# Patient Record
Sex: FEMALE | Employment: UNEMPLOYED | URBAN - METROPOLITAN AREA
[De-identification: names, ages, dates, MRNs, and addresses within clinical notes are randomized per-mention and may not be internally consistent; named-entity substitution may affect disease eponyms.]

---

## 2021-04-11 ENCOUNTER — HOSPITAL ENCOUNTER (EMERGENCY)
Facility: CLINIC | Age: 46
Discharge: HOME | End: 2021-04-12
Attending: EMERGENCY MEDICINE
Payer: COMMERCIAL

## 2021-04-11 ENCOUNTER — APPOINTMENT (OUTPATIENT)
Dept: CT IMAGING | Facility: CLINIC | Age: 46
End: 2021-04-11
Attending: EMERGENCY MEDICINE
Payer: COMMERCIAL

## 2021-04-11 ENCOUNTER — HOSPITAL ENCOUNTER (EMERGENCY)
Facility: CLINIC | Age: 46
Discharge: HOME | End: 2021-04-11
Attending: EMERGENCY MEDICINE | Admitting: EMERGENCY MEDICINE
Payer: COMMERCIAL

## 2021-04-11 VITALS
HEART RATE: 97 BPM | DIASTOLIC BLOOD PRESSURE: 86 MMHG | BODY MASS INDEX: 25.51 KG/M2 | WEIGHT: 149.4 LBS | OXYGEN SATURATION: 98 % | TEMPERATURE: 97.9 F | RESPIRATION RATE: 18 BRPM | SYSTOLIC BLOOD PRESSURE: 124 MMHG | HEIGHT: 64 IN

## 2021-04-11 DIAGNOSIS — U07.1 INFECTION DUE TO 2019 NOVEL CORONAVIRUS: ICD-10-CM

## 2021-04-11 DIAGNOSIS — R00.2 PALPITATIONS: ICD-10-CM

## 2021-04-11 DIAGNOSIS — F41.9 ANXIETY: ICD-10-CM

## 2021-04-11 DIAGNOSIS — E87.6 HYPOKALEMIA: ICD-10-CM

## 2021-04-11 DIAGNOSIS — U07.1 2019 NOVEL CORONAVIRUS DISEASE (COVID-19): ICD-10-CM

## 2021-04-11 LAB
ALBUMIN SERPL-MCNC: 3.8 G/DL (ref 3.4–5)
ALP SERPL-CCNC: 67 U/L (ref 40–150)
ALT SERPL W P-5'-P-CCNC: 31 U/L (ref 0–50)
ANION GAP SERPL CALCULATED.3IONS-SCNC: 12 MMOL/L (ref 3–14)
ANION GAP SERPL CALCULATED.3IONS-SCNC: 7 MMOL/L (ref 3–14)
AST SERPL W P-5'-P-CCNC: 16 U/L (ref 0–45)
BASOPHILS # BLD AUTO: 0 10E9/L (ref 0–0.2)
BASOPHILS # BLD AUTO: 0 10E9/L (ref 0–0.2)
BASOPHILS NFR BLD AUTO: 0.3 %
BASOPHILS NFR BLD AUTO: 0.4 %
BILIRUB SERPL-MCNC: 0.7 MG/DL (ref 0.2–1.3)
BUN SERPL-MCNC: 13 MG/DL (ref 7–30)
BUN SERPL-MCNC: 6 MG/DL (ref 7–30)
CALCIUM SERPL-MCNC: 9 MG/DL (ref 8.5–10.1)
CALCIUM SERPL-MCNC: 9.3 MG/DL (ref 8.5–10.1)
CHLORIDE SERPL-SCNC: 105 MMOL/L (ref 94–109)
CHLORIDE SERPL-SCNC: 106 MMOL/L (ref 94–109)
CO2 BLDCOV-SCNC: 17 MMOL/L (ref 21–28)
CO2 BLDCOV-SCNC: 22 MMOL/L (ref 21–28)
CO2 SERPL-SCNC: 20 MMOL/L (ref 20–32)
CO2 SERPL-SCNC: 24 MMOL/L (ref 20–32)
CREAT SERPL-MCNC: 0.67 MG/DL (ref 0.52–1.04)
CREAT SERPL-MCNC: 0.77 MG/DL (ref 0.52–1.04)
D DIMER PPP FEU-MCNC: 2.3 UG/ML FEU (ref 0–0.5)
DIFFERENTIAL METHOD BLD: NORMAL
DIFFERENTIAL METHOD BLD: NORMAL
EOSINOPHIL # BLD AUTO: 0 10E9/L (ref 0–0.7)
EOSINOPHIL # BLD AUTO: 0 10E9/L (ref 0–0.7)
EOSINOPHIL NFR BLD AUTO: 0.3 %
EOSINOPHIL NFR BLD AUTO: 0.5 %
ERYTHROCYTE [DISTWIDTH] IN BLOOD BY AUTOMATED COUNT: 11.9 % (ref 10–15)
ERYTHROCYTE [DISTWIDTH] IN BLOOD BY AUTOMATED COUNT: 11.9 % (ref 10–15)
GFR SERPL CREATININE-BSD FRML MDRD: >90 ML/MIN/{1.73_M2}
GFR SERPL CREATININE-BSD FRML MDRD: >90 ML/MIN/{1.73_M2}
GLUCOSE SERPL-MCNC: 117 MG/DL (ref 70–99)
GLUCOSE SERPL-MCNC: 144 MG/DL (ref 70–99)
HCG SERPL QL: NEGATIVE
HCT VFR BLD AUTO: 40.9 % (ref 35–47)
HCT VFR BLD AUTO: 41.7 % (ref 35–47)
HGB BLD-MCNC: 14.1 G/DL (ref 11.7–15.7)
HGB BLD-MCNC: 14.3 G/DL (ref 11.7–15.7)
IMM GRANULOCYTES # BLD: 0 10E9/L (ref 0–0.4)
IMM GRANULOCYTES # BLD: 0 10E9/L (ref 0–0.4)
IMM GRANULOCYTES NFR BLD: 0.1 %
IMM GRANULOCYTES NFR BLD: 0.3 %
LACTATE BLD-SCNC: 0.8 MMOL/L (ref 0.7–2)
LACTATE BLD-SCNC: 0.8 MMOL/L (ref 0.7–2.1)
LACTATE BLD-SCNC: 4.4 MMOL/L (ref 0.7–2.1)
LYMPHOCYTES # BLD AUTO: 2.3 10E9/L (ref 0.8–5.3)
LYMPHOCYTES # BLD AUTO: 2.7 10E9/L (ref 0.8–5.3)
LYMPHOCYTES NFR BLD AUTO: 26.4 %
LYMPHOCYTES NFR BLD AUTO: 36.8 %
MCH RBC QN AUTO: 29.7 PG (ref 26.5–33)
MCH RBC QN AUTO: 30.1 PG (ref 26.5–33)
MCHC RBC AUTO-ENTMCNC: 34.3 G/DL (ref 31.5–36.5)
MCHC RBC AUTO-ENTMCNC: 34.5 G/DL (ref 31.5–36.5)
MCV RBC AUTO: 87 FL (ref 78–100)
MCV RBC AUTO: 87 FL (ref 78–100)
MONOCYTES # BLD AUTO: 0.6 10E9/L (ref 0–1.3)
MONOCYTES # BLD AUTO: 0.7 10E9/L (ref 0–1.3)
MONOCYTES NFR BLD AUTO: 7.8 %
MONOCYTES NFR BLD AUTO: 7.8 %
NEUTROPHILS # BLD AUTO: 4 10E9/L (ref 1.6–8.3)
NEUTROPHILS # BLD AUTO: 5.8 10E9/L (ref 1.6–8.3)
NEUTROPHILS NFR BLD AUTO: 54.2 %
NEUTROPHILS NFR BLD AUTO: 65.1 %
NRBC # BLD AUTO: 0 10*3/UL
NRBC # BLD AUTO: 0 10*3/UL
NRBC BLD AUTO-RTO: 0 /100
NRBC BLD AUTO-RTO: 0 /100
PCO2 BLDV: 21 MM HG (ref 40–50)
PCO2 BLDV: 37 MM HG (ref 40–50)
PH BLDV: 7.38 PH (ref 7.32–7.43)
PH BLDV: 7.52 PH (ref 7.32–7.43)
PLATELET # BLD AUTO: 345 10E9/L (ref 150–450)
PLATELET # BLD AUTO: 362 10E9/L (ref 150–450)
PO2 BLDV: 40 MM HG (ref 25–47)
PO2 BLDV: 47 MM HG (ref 25–47)
POTASSIUM SERPL-SCNC: 2.8 MMOL/L (ref 3.4–5.3)
POTASSIUM SERPL-SCNC: 3.7 MMOL/L (ref 3.4–5.3)
PROCALCITONIN SERPL-MCNC: <0.05 NG/ML
PROT SERPL-MCNC: 8 G/DL (ref 6.8–8.8)
RBC # BLD AUTO: 4.69 10E12/L (ref 3.8–5.2)
RBC # BLD AUTO: 4.81 10E12/L (ref 3.8–5.2)
SAO2 % BLDV FROM PO2: 75 %
SAO2 % BLDV FROM PO2: 88 %
SODIUM SERPL-SCNC: 137 MMOL/L (ref 133–144)
SODIUM SERPL-SCNC: 137 MMOL/L (ref 133–144)
TROPONIN I SERPL-MCNC: <0.015 UG/L (ref 0–0.04)
TSH SERPL DL<=0.005 MIU/L-ACNC: 1.1 MU/L (ref 0.4–4)
WBC # BLD AUTO: 7.3 10E9/L (ref 4–11)
WBC # BLD AUTO: 8.9 10E9/L (ref 4–11)

## 2021-04-11 PROCEDURE — 99285 EMERGENCY DEPT VISIT HI MDM: CPT | Mod: 25

## 2021-04-11 PROCEDURE — 84145 PROCALCITONIN (PCT): CPT | Performed by: EMERGENCY MEDICINE

## 2021-04-11 PROCEDURE — 83605 ASSAY OF LACTIC ACID: CPT

## 2021-04-11 PROCEDURE — 82803 BLOOD GASES ANY COMBINATION: CPT

## 2021-04-11 PROCEDURE — 80053 COMPREHEN METABOLIC PANEL: CPT | Performed by: EMERGENCY MEDICINE

## 2021-04-11 PROCEDURE — 96361 HYDRATE IV INFUSION ADD-ON: CPT

## 2021-04-11 PROCEDURE — 99285 EMERGENCY DEPT VISIT HI MDM: CPT | Mod: 25,27

## 2021-04-11 PROCEDURE — 80048 BASIC METABOLIC PNL TOTAL CA: CPT | Performed by: EMERGENCY MEDICINE

## 2021-04-11 PROCEDURE — 99291 CRITICAL CARE FIRST HOUR: CPT | Mod: 25

## 2021-04-11 PROCEDURE — 87040 BLOOD CULTURE FOR BACTERIA: CPT | Mod: XS | Performed by: EMERGENCY MEDICINE

## 2021-04-11 PROCEDURE — 83605 ASSAY OF LACTIC ACID: CPT | Performed by: EMERGENCY MEDICINE

## 2021-04-11 PROCEDURE — 250N000011 HC RX IP 250 OP 636: Performed by: EMERGENCY MEDICINE

## 2021-04-11 PROCEDURE — 84703 CHORIONIC GONADOTROPIN ASSAY: CPT | Performed by: EMERGENCY MEDICINE

## 2021-04-11 PROCEDURE — 71275 CT ANGIOGRAPHY CHEST: CPT

## 2021-04-11 PROCEDURE — 96365 THER/PROPH/DIAG IV INF INIT: CPT | Mod: 59

## 2021-04-11 PROCEDURE — 258N000003 HC RX IP 258 OP 636: Performed by: EMERGENCY MEDICINE

## 2021-04-11 PROCEDURE — 84443 ASSAY THYROID STIM HORMONE: CPT | Performed by: EMERGENCY MEDICINE

## 2021-04-11 PROCEDURE — 96375 TX/PRO/DX INJ NEW DRUG ADDON: CPT

## 2021-04-11 PROCEDURE — 250N000013 HC RX MED GY IP 250 OP 250 PS 637: Performed by: EMERGENCY MEDICINE

## 2021-04-11 PROCEDURE — 96367 TX/PROPH/DG ADDL SEQ IV INF: CPT

## 2021-04-11 PROCEDURE — 85025 COMPLETE CBC W/AUTO DIFF WBC: CPT | Performed by: EMERGENCY MEDICINE

## 2021-04-11 PROCEDURE — 85379 FIBRIN DEGRADATION QUANT: CPT | Performed by: EMERGENCY MEDICINE

## 2021-04-11 PROCEDURE — 250N000009 HC RX 250: Performed by: EMERGENCY MEDICINE

## 2021-04-11 PROCEDURE — 99292 CRITICAL CARE ADDL 30 MIN: CPT

## 2021-04-11 PROCEDURE — 84484 ASSAY OF TROPONIN QUANT: CPT | Performed by: EMERGENCY MEDICINE

## 2021-04-11 PROCEDURE — 250N000011 HC RX IP 250 OP 636

## 2021-04-11 PROCEDURE — 96374 THER/PROPH/DIAG INJ IV PUSH: CPT | Mod: XE

## 2021-04-11 RX ORDER — POTASSIUM CHLORIDE 7.45 MG/ML
10 INJECTION INTRAVENOUS ONCE
Status: COMPLETED | OUTPATIENT
Start: 2021-04-11 | End: 2021-04-11

## 2021-04-11 RX ORDER — PIPERACILLIN SODIUM, TAZOBACTAM SODIUM 4; .5 G/20ML; G/20ML
4.5 INJECTION, POWDER, LYOPHILIZED, FOR SOLUTION INTRAVENOUS ONCE
Status: COMPLETED | OUTPATIENT
Start: 2021-04-11 | End: 2021-04-11

## 2021-04-11 RX ORDER — IOPAMIDOL 755 MG/ML
59 INJECTION, SOLUTION INTRAVASCULAR ONCE
Status: COMPLETED | OUTPATIENT
Start: 2021-04-11 | End: 2021-04-11

## 2021-04-11 RX ORDER — LORAZEPAM 2 MG/ML
0.5 INJECTION INTRAMUSCULAR ONCE
Status: COMPLETED | OUTPATIENT
Start: 2021-04-11 | End: 2021-04-11

## 2021-04-11 RX ORDER — ONDANSETRON 2 MG/ML
INJECTION INTRAMUSCULAR; INTRAVENOUS
Status: COMPLETED
Start: 2021-04-11 | End: 2021-04-11

## 2021-04-11 RX ORDER — POTASSIUM CHLORIDE 1.5 G/1.58G
40 POWDER, FOR SOLUTION ORAL ONCE
Status: COMPLETED | OUTPATIENT
Start: 2021-04-11 | End: 2021-04-11

## 2021-04-11 RX ORDER — POTASSIUM CHLORIDE 750 MG/1
10 TABLET, EXTENDED RELEASE ORAL 2 TIMES DAILY
Qty: 30 TABLET | Refills: 0 | Status: SHIPPED | OUTPATIENT
Start: 2021-04-11

## 2021-04-11 RX ORDER — ONDANSETRON 2 MG/ML
4 INJECTION INTRAMUSCULAR; INTRAVENOUS ONCE
Status: COMPLETED | OUTPATIENT
Start: 2021-04-11 | End: 2021-04-11

## 2021-04-11 RX ADMIN — SODIUM CHLORIDE 1000 ML: 9 INJECTION, SOLUTION INTRAVENOUS at 10:50

## 2021-04-11 RX ADMIN — PIPERACILLIN AND TAZOBACTAM 4.5 G: 4; .5 INJECTION, POWDER, FOR SOLUTION INTRAVENOUS at 11:37

## 2021-04-11 RX ADMIN — SODIUM CHLORIDE 86 ML: 9 INJECTION, SOLUTION INTRAVENOUS at 11:55

## 2021-04-11 RX ADMIN — SODIUM CHLORIDE 1000 ML: 9 INJECTION, SOLUTION INTRAVENOUS at 22:52

## 2021-04-11 RX ADMIN — POTASSIUM CHLORIDE 40 MEQ: 1.5 POWDER, FOR SOLUTION ORAL at 13:00

## 2021-04-11 RX ADMIN — POTASSIUM CHLORIDE 10 MEQ: 7.46 INJECTION, SOLUTION INTRAVENOUS at 13:04

## 2021-04-11 RX ADMIN — IOPAMIDOL 59 ML: 755 INJECTION, SOLUTION INTRAVENOUS at 11:54

## 2021-04-11 RX ADMIN — SODIUM CHLORIDE 1000 ML: 9 INJECTION, SOLUTION INTRAVENOUS at 11:27

## 2021-04-11 RX ADMIN — ONDANSETRON: 2 INJECTION INTRAMUSCULAR; INTRAVENOUS at 11:06

## 2021-04-11 RX ADMIN — LORAZEPAM 0.5 MG: 2 INJECTION INTRAMUSCULAR; INTRAVENOUS at 22:53

## 2021-04-11 ASSESSMENT — ENCOUNTER SYMPTOMS
NAUSEA: 0
SHORTNESS OF BREATH: 1
VOMITING: 0
PALPITATIONS: 1
DIZZINESS: 1
DIARRHEA: 1
PALPITATIONS: 1
FEVER: 0
ABDOMINAL PAIN: 0
COUGH: 0
NAUSEA: 0

## 2021-04-11 ASSESSMENT — MIFFLIN-ST. JEOR: SCORE: 1312.67

## 2021-04-11 NOTE — DISCHARGE INSTRUCTIONS
Discharge Instructions  COVID-19    COVID-19 is the disease caused by a new coronavirus. The virus spreads from person-to-person primarily by droplets when an infected person coughs or sneezes and the droplet either lands on another person or that other person touches a surface with the droplet on it. There are tests available to diagnose COVID-19. There is no specific treatment or medicine for the disease.    You may have been diagnosed with COVID, may be being tested for COVID and have a pending test result, or may have been exposed to COVID.    Symptoms of COVID-19    Many people have no symptoms or mild symptoms.  Symptoms may usually appear 4 to 5 days (up to 14 days) after contact with a person with COVID-19. Some people will get severe symptoms and pneumonia. Usual symptoms are:     ? Fever  ? Cough  ? Trouble breathing    Less common symptoms are: Headache, body aches, sore throat, sneezing, diarrhea, loss of taste or smell.    Isolation and Quarantine    You were seen because you have symptoms, had an exposure, or had some other concern about possible COVID. The best way to stop the spread of the virus is to avoid contact with others.  Isolation refers to sick people staying away from people who are not sick. A person in quarantine is limiting activity because they were exposed and are waiting to see if they might become sick.    If you test positive for COVID, you should stay home (isolation) for at least 10 days after your symptoms began, and for 24 hours with no fever and improvement of symptoms--whichever is longer. (Your fever should be gone for 24 hours without using fever-reducing medicine). If you have no symptoms, you should stay home (isolation) for 10 days from the day of the test.    For example, if you have a fever and cough for 6 days, you need to stay home 4 more days with no fever for a total of 10 days. Or, if you have a fever and cough for 10 days, you need to stay home one more day with  no fever for a total of 11 days.    If you have a high-risk exposure to COVID (you spent 15 minutes or more within six feet of somebody who has COVID), you should stay home (quarantine) for 14 days. Even if you test negative for COVID, the CDC recommends a 14-day quarantine from the time of your last exposure to that individual. There are options for a shortened (<14 day quarantine) you can review at:    https://[email or web address]./diseases/coronavirus/close.html#long    If you have symptoms but a negative test, you should stay at home until you are symptom-free and without fever for 24 hours, using the same judgment you would for when it is safe to return to work/school from strep throat, influenza, or the common cold. If you worsen, you should consider being re-evaluated.    If you are being tested for COVID and your test is pending, you should stay home until you know your test result.    How should I protect myself and others?    Do not go to work or school. Have a friend or relative do your shopping. Do not use public transportation (bus, train) or ridesharing (Lyft, Uber).    Separate yourself from other people in your home. As much as possible, you should stay in one room and away from other people in your home. Also, use a separate bathroom, if possible. Avoid handling pets or other animals while sick.     Wear a facemask if you need to be around other people and cover your mouth and nose with a tissue when you cough or sneeze.     Avoid sharing personal household items. You should not share dishes, drinking glasses, forks/knives/spoons, towels, or bedding with other people in your home. After using these items, they should be washed with soap and water. Clean parts of your home that are touched often (doorknobs, faucets, countertops, etc.) daily.     Wash your hands often with soap and water for at least 20 seconds or use an alcohol-based hand  containing at least 60% alcohol.     Avoid touching  your face.    Treat your symptoms. You can take Acetaminophen (Tylenol) to treat body aches and fever as needed for comfort. Ibuprofen (Advil or Motrin) can be used as well if you still have symptoms after taking Tylenol. Drink fluids. Rest.    Watch for worsening symptoms such as shortness of breath/difficulty breathing or very severe weakness.    Employers/workplaces are being asked by the Centers for Disease Control (CDC) to not request notes/documentation for you to return to work or prove that you were ill. You may choose to show your employer this paperwork. Also, repeat testing should not be required to return to work.    Exercise/Sports in rare cases, COVID could affect your heart in a way that makes exercise or participation in sports dangerous.  If you have a mild COVID illness (fever, cough, sore throat, and similar symptoms but no difficulty breathing or abnormalities of the lung): After your COVID symptoms have resolved, wait 14-days before returning to activity.  If you have more than a mild illness (meaning that you have problems with your breathing or lungs) or if you participate in competitive or strenuous activity or have a history of heart disease: Please see your primary doctor/provider prior to return to activity/competition.    Return to the Emergency Department if:    If you are developing worsening breathing, shortness of breath, or feel worse you should seek medical attention.  If you are uncertain, contact your health care provider/clinic. If you need emergency medical attention, call 911 and tell them you have been ill.
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
## 2021-04-11 NOTE — ED TRIAGE NOTES
COVID + 12 days ago.  Recent xray and CT at [facility] in [place] negative.  Today developed shortness of breath, nausea, then palpitations.  Arrives 98% on room air, , slightly anxious

## 2021-04-11 NOTE — ED NOTES
Bed: [facility]  Expected date: 4/11/21  Expected time: 10:27 AM  Means of arrival: Ambulance  Comments:  Ramu 44f covid,   ETA 1039

## 2021-04-12 VITALS
TEMPERATURE: 98.3 F | OXYGEN SATURATION: 97 % | BODY MASS INDEX: 25.64 KG/M2 | HEART RATE: 73 BPM | RESPIRATION RATE: 16 BRPM | WEIGHT: 149.4 LBS | DIASTOLIC BLOOD PRESSURE: 79 MMHG | SYSTOLIC BLOOD PRESSURE: 127 MMHG

## 2021-04-12 LAB
INTERPRETATION ECG - MUSE: NORMAL
INTERPRETATION ECG - MUSE: NORMAL

## 2021-04-12 RX ORDER — LORAZEPAM 0.5 MG/1
0.5 TABLET ORAL EVERY 6 HOURS PRN
Qty: 10 TABLET | Refills: 0 | Status: SHIPPED | OUTPATIENT
Start: 2021-04-12 | End: 2021-04-21 | Stop reason: ALTCHOICE

## 2021-04-12 RX ORDER — ONDANSETRON 4 MG/1
4 TABLET, ORALLY DISINTEGRATING ORAL EVERY 8 HOURS PRN
Qty: 10 TABLET | Refills: 0 | Status: SHIPPED | OUTPATIENT
Start: 2021-04-12 | End: 2021-04-15

## 2021-04-12 NOTE — DISCHARGE INSTRUCTIONS
We have performed a lot of test to assess you for rapid heart rate and these are normal.  After another liter of fluid and anxiety medication your heart rate is come back to normal.  It is okay to use Ativan occasionally when needed for anxiety.  There has been no signs of blood clots or any other major abnormality today.  If you measure a pulse that is quite high it is persistent the emergency room is always here to see you reassess you please discuss with your regular doctor the utility of Holter monitoring if you continue to have spells of rapid pulse that come and go.

## 2021-04-12 NOTE — ED PROVIDER NOTES
History   Chief Complaint:  Palpitations    The history is provided by the patient and medical records.      Mary Sauceda is a 45 year old female who tested positive for COVID-19 on 04/06 who presents with palpitations. The patient was seen in the ED earlier this morning for palpitations, were imaging and labs were performed, results below, given IV fluids and discharged home on Potassium Chloride for hypokalemia. Here, she reports her heart rate went down before leaving the ED and remained stable until abut 30 minutes ago when she began to notice her heart racing really quickly. The patient reports that her COVID-19 symptoms of nausea, diarrhea, and cough that started 12 days ago. Her cough, nausea, and fevers have since been resolved for 4 days. She states she has been having one episode of diarrhea for the last 12 days. Denies recent antibiotic use.     CT Chest Pulmonary Embolism w Contrast from 04/11/2021:  1.  No evidence of pulmonary embolism. Thoracic aorta is unremarkable.  2.  Peripheral patchy lung consolidation consistent with pneumonia. Typical pneumonia thought to be less likely but remains in the differential. No enlarged lymph nodes.  3.  Mild cardiomegaly without evidence of pulmonary edema. No pleural fluid.  Reading per radiology     Laboratory from 04/11/2021:  CBC: WBC 7.3, HGB 14.3,    CMP: Potassium 2.8 (L), Glucose 144 (H), o/w WNL (Creatinine 0.77)   D dimer: 2.3 (H)  Troponin (Collected 1050): <0.015  TSH: 1.10  HCG Qualitative Blood: Negative  Procalcitonin: <0.05  Blood cultures pending x2  ISTAT gases lactate nora POCT (collected 1059): pH 7.52 (H), PCO2 21 (L), PO2 40, Bicarbonate 17 (L), O2 88, Lactic 4.4 (H)  ISTAT gases lactate nora POCT (collected 1307): pH 7.38, PCO2 7 (L), PO2 40, Bicarbonate 22, O2 75, Lactic 0.8    Review of Systems   Constitutional: Negative for fever.   Respiratory: Negative for cough.    Cardiovascular: Positive for palpitations.    Gastrointestinal: Positive for diarrhea. Negative for nausea.   All other systems reviewed and are negative.    Allergies:  No known drug allergies    Medications:  Klor-Con M    Past Medical History:    Denies past medical history    Past Surgical History:    Nasal/sinus polypectomy    Social History:  Patient presents alone.     Physical Exam     Patient Vitals for the past 24 hrs:   BP Temp Temp src Pulse Resp SpO2 Weight   04/11/21 2300 (!) 140/84 -- -- 85 16 96 % --   04/11/21 2245 -- -- -- 88 13 98 % --   04/11/21 2230 (!) 150/85 -- -- 107 18 100 % --   04/11/21 2215 (!) 148/83 -- -- 86 18 100 % --   04/11/21 2211 (!) 148/87 98.4  F (36.9  C) Oral 106 18 100 % --   04/11/21 2200 (!) 148/87 -- -- 81 18 100 % --   04/11/21 2134 (!) 155/86 97.2  F (36.2  C) Temporal 130 16 100 % 67.8 kg (149 lb 6.4 oz)       Physical Exam  Vitals signs reviewed.   HENT:      Right Ear: Tympanic membrane normal.      Left Ear: Tympanic membrane normal.      Nose: Nose normal.      Mouth/Throat:      Mouth: Mucous membranes are moist.   Eyes:      Pupils: Pupils are equal, round, and reactive to light.   Cardiovascular:      Rate and Rhythm: Regular rhythm. Tachycardia present.   Pulmonary:      Effort: Pulmonary effort is normal.      Breath sounds: Normal breath sounds.   Abdominal:      General: Abdomen is flat.      Palpations: Abdomen is soft.   Skin:     General: Skin is warm.      Capillary Refill: Capillary refill takes less than 2 seconds.   Neurological:      General: No focal deficit present.      Mental Status: She is alert.   Psychiatric:         Mood and Affect: Mood normal.      Comments: Seems anxious           Emergency Department Course   ECG  ECG taken at 2150, ECG read at 2201  Sinus tachycardia  Possible left atrial enlargement  Nonspecific ST and T wave abnormality  Abnormal ECG   No significant change as compared to prior, dated 04/11/2021  Rate 116 bpm. [place] interval 166 ms. QRS duration 76 ms.   QT/QTc  324/450 ms. P-R-T axes 63 81 52.     Laboratory:  CBC: WBC 8.9, HGB 14.1,    BMP: Glucose 117(H), BUN 6(L) o/w WNL (Creatinine 0.67)   Lactic acid (Resulted: 2350): 0.8    Emergency Department Course:    Reviewed:  I reviewed nursing notes, vitals, past medical history and care everywhere    Assessments:  2140 I obtained history and examined the patient as noted above.   2229 I rechecked the patient and explained findings.   2357 I rechecked and updated the patient.     Interventions:  2252 NS 1,000 mL IV  2253 Ativan 0.5 mg IV    Disposition:  The patient was discharged to home.     Impression & Plan   CMS Diagnoses: None  Medical Decision Making:  Patient returned to the emergency room with palpitations and concerns for elevated heart rate.  On arrival patient's pulse was noted to be 120 patient was seen in the emergency room earlier today and an extensive work-up for similar symptoms including an elevated lactic acid because of which was likely dehydration patient seems quite anxious EKG shows sinus rhythm without concern for arrhythmia.  Repeat lab work was obtained including lactic acid which were all normal.  Patient is given a liter of fluid and half a milligram of Ativan the patient's heart rate improved to 70.  Suspect anxiety.  Patient does have COVID-19 no need for admission was identified oxygen saturations are normal patient was offered reassurance anxiolysis and follow-up with primary care.        Covid-19  Mary Sauceda was evaluated during a global COVID-19 pandemic, which necessitated consideration that the patient might be at risk for infection with the SARS-CoV-2 virus that causes COVID-19.   Applicable protocols for evaluation were followed during the patient's care. COVID-19 was considered as part of the patient's evaluation. The plan for testing is: a test was obtained at a previous visit and reviewed & considered today.    Diagnosis:    ICD-10-CM    1. Palpitations  R00.2    2.  Infection due to 2019 novel coronavirus  U07.1    3. Anxiety  F41.9        Discharge Medications:  Discharge Medication List as of 4/12/2021 12:39 AM      START taking these medications    Details   LORazepam (ATIVAN) 0.5 MG tablet Take 1 tablet (0.5 mg) by mouth every 6 hours as needed for anxiety, Disp-10 tablet, R-0, Local Print      ondansetron (ZOFRAN ODT) 4 MG ODT tab Take 1 tablet (4 mg) by mouth every 8 hours as needed for nausea or vomiting, Disp-10 tablet, R-0, Local Print             Scribe Disclosure:  I, Marcia Jackson, am serving as a scribe at 9:40 PM on 4/11/2021 to document services personally performed by Dudley Boykin MD based on my observations and the provider's statements to me.            Dudley Boykin MD  04/12/21 0117

## 2021-04-12 NOTE — ED TRIAGE NOTES
Patient is currently covid positive, seen in ED a few hours ago for palpitations, shortness of breath and was diagnosed with hypokalemia. Patient was discharged home after treatment. Returning back to ED for palpitation getting worse. Shortness of breath is ongoing.

## 2021-04-13 ENCOUNTER — HOSPITAL ENCOUNTER (EMERGENCY)
Facility: CLINIC | Age: 46
Discharge: HOME | End: 2021-04-13
Payer: COMMERCIAL

## 2021-04-13 ENCOUNTER — HOSPITAL ENCOUNTER (EMERGENCY)
Facility: CLINIC | Age: 46
Discharge: HOME | End: 2021-04-13
Attending: EMERGENCY MEDICINE | Admitting: EMERGENCY MEDICINE
Payer: COMMERCIAL

## 2021-04-13 VITALS
RESPIRATION RATE: 22 BRPM | TEMPERATURE: 97 F | SYSTOLIC BLOOD PRESSURE: 152 MMHG | OXYGEN SATURATION: 98 % | HEART RATE: 139 BPM | WEIGHT: 146 LBS | BODY MASS INDEX: 25.06 KG/M2 | DIASTOLIC BLOOD PRESSURE: 89 MMHG

## 2021-04-13 DIAGNOSIS — R00.0 TACHYCARDIA: ICD-10-CM

## 2021-04-13 DIAGNOSIS — U07.1 2019 NOVEL CORONAVIRUS DISEASE (COVID-19): ICD-10-CM

## 2021-04-13 DIAGNOSIS — F41.9 ANXIETY: ICD-10-CM

## 2021-04-13 LAB
ANION GAP SERPL CALCULATED.3IONS-SCNC: 6 MMOL/L (ref 3–14)
BASOPHILS # BLD AUTO: 0 10E9/L (ref 0–0.2)
BASOPHILS NFR BLD AUTO: 0.3 %
BUN SERPL-MCNC: 5 MG/DL (ref 7–30)
CALCIUM SERPL-MCNC: 9.2 MG/DL (ref 8.5–10.1)
CHLORIDE SERPL-SCNC: 104 MMOL/L (ref 94–109)
CO2 SERPL-SCNC: 25 MMOL/L (ref 20–32)
CREAT SERPL-MCNC: 0.74 MG/DL (ref 0.52–1.04)
DIFFERENTIAL METHOD BLD: NORMAL
EOSINOPHIL # BLD AUTO: 0 10E9/L (ref 0–0.7)
EOSINOPHIL NFR BLD AUTO: 0.6 %
ERYTHROCYTE [DISTWIDTH] IN BLOOD BY AUTOMATED COUNT: 12.1 % (ref 10–15)
GFR SERPL CREATININE-BSD FRML MDRD: >90 ML/MIN/{1.73_M2}
GLUCOSE SERPL-MCNC: 115 MG/DL (ref 70–99)
HCT VFR BLD AUTO: 42.1 % (ref 35–47)
HGB BLD-MCNC: 14.3 G/DL (ref 11.7–15.7)
IMM GRANULOCYTES # BLD: 0 10E9/L (ref 0–0.4)
IMM GRANULOCYTES NFR BLD: 0.3 %
INTERPRETATION ECG - MUSE: NORMAL
LACTATE BLD-SCNC: 2.2 MMOL/L (ref 0.7–2)
LYMPHOCYTES # BLD AUTO: 2.4 10E9/L (ref 0.8–5.3)
LYMPHOCYTES NFR BLD AUTO: 34.2 %
MCH RBC QN AUTO: 29.8 PG (ref 26.5–33)
MCHC RBC AUTO-ENTMCNC: 34 G/DL (ref 31.5–36.5)
MCV RBC AUTO: 88 FL (ref 78–100)
MONOCYTES # BLD AUTO: 0.5 10E9/L (ref 0–1.3)
MONOCYTES NFR BLD AUTO: 7.8 %
NEUTROPHILS # BLD AUTO: 3.9 10E9/L (ref 1.6–8.3)
NEUTROPHILS NFR BLD AUTO: 56.8 %
NRBC # BLD AUTO: 0 10*3/UL
NRBC BLD AUTO-RTO: 0 /100
PLATELET # BLD AUTO: 400 10E9/L (ref 150–450)
POTASSIUM SERPL-SCNC: 3.3 MMOL/L (ref 3.4–5.3)
RBC # BLD AUTO: 4.8 10E12/L (ref 3.8–5.2)
SODIUM SERPL-SCNC: 135 MMOL/L (ref 133–144)
TROPONIN I SERPL-MCNC: <0.015 UG/L (ref 0–0.04)
WBC # BLD AUTO: 6.9 10E9/L (ref 4–11)

## 2021-04-13 PROCEDURE — 80048 BASIC METABOLIC PNL TOTAL CA: CPT | Performed by: EMERGENCY MEDICINE

## 2021-04-13 PROCEDURE — 250N000011 HC RX IP 250 OP 636: Performed by: EMERGENCY MEDICINE

## 2021-04-13 PROCEDURE — 99284 EMERGENCY DEPT VISIT MOD MDM: CPT | Mod: 25

## 2021-04-13 PROCEDURE — 85025 COMPLETE CBC W/AUTO DIFF WBC: CPT | Performed by: EMERGENCY MEDICINE

## 2021-04-13 PROCEDURE — 83605 ASSAY OF LACTIC ACID: CPT | Performed by: EMERGENCY MEDICINE

## 2021-04-13 PROCEDURE — 96361 HYDRATE IV INFUSION ADD-ON: CPT

## 2021-04-13 PROCEDURE — 84484 ASSAY OF TROPONIN QUANT: CPT | Performed by: EMERGENCY MEDICINE

## 2021-04-13 PROCEDURE — 96374 THER/PROPH/DIAG INJ IV PUSH: CPT

## 2021-04-13 PROCEDURE — 258N000003 HC RX IP 258 OP 636: Performed by: EMERGENCY MEDICINE

## 2021-04-13 PROCEDURE — 93005 ELECTROCARDIOGRAM TRACING: CPT

## 2021-04-13 RX ORDER — ONDANSETRON 2 MG/ML
4 INJECTION INTRAMUSCULAR; INTRAVENOUS ONCE
Status: COMPLETED | OUTPATIENT
Start: 2021-04-13 | End: 2021-04-13

## 2021-04-13 RX ADMIN — SODIUM CHLORIDE 1000 ML: 9 INJECTION, SOLUTION INTRAVENOUS at 13:03

## 2021-04-13 RX ADMIN — ONDANSETRON 4 MG: 2 INJECTION INTRAMUSCULAR; INTRAVENOUS at 13:05

## 2021-04-13 RX ADMIN — SODIUM CHLORIDE 1000 ML: 9 INJECTION, SOLUTION INTRAVENOUS at 13:46

## 2021-04-13 ASSESSMENT — ENCOUNTER SYMPTOMS
SORE THROAT: 0
COUGH: 0
BLOOD IN STOOL: 0
APPETITE CHANGE: 1
VOMITING: 0
DIARRHEA: 1
TREMORS: 1
NERVOUS/ANXIOUS: 1
WEAKNESS: 1
PALPITATIONS: 1
ABDOMINAL PAIN: 1
NAUSEA: 1
DIZZINESS: 0
SHORTNESS OF BREATH: 0

## 2021-04-13 NOTE — ED TRIAGE NOTES
Patient states she was seen on Sunday twice for similar symptoms. She is tachycardic with shortness of breath.

## 2021-04-13 NOTE — ED PROVIDER NOTES
History   Chief Complaint:  Palpitations and Covid Concern       The history is provided by the patient.      Mayr Sauceda is an otherwise healthy 45 year old female presenting with feeling weak and tachycardic. She was traveling in [place], [place], when she was diagnosed with COVID-19 around 03/31/2021. At that time, she had fever, cough, and shortness of breath. She has had about 1 episodes of liquid non bloody stool a day with no focal abdominal pain. She was seen by [facility] ED on 04/06 for dehydration and shortness of breath. CTA chest obtained at that time showed findings that were consistent with COVID pneumonia. The patient came to the ED here twice for shortness of breath and palpitations, both on 04/11. She was ultimately discharged on 04/11 with the diagnosis of COVID-19 and palpitations, and was given Ativan for what they felt was some anxiety as well. The patient states that she no longer feels short of breath. She has not really had a cough. She has no sore throat and full taste and smell. She feels like she is not taking enough orally in, and she feels weak and shaky. She did take Zofran today.  She is on potassium supplements as her potassium is 2.8 on earlier visits.  She was still nauseated, but no vomiting. She did take Ativan as she acknowledges that feeling ill makes her feel anxious. She has no vertigo or chest pain.    04/06/2021 CT Chest Angiogram and Pulmonary Arteries with IV Contrast:  No pulmonary embolism. Lung findings suggest mildly to moderately severe acute pulmonary involvement from Covid 19. Nodule in the right lobe of the thyroid gland measuring at least 22 x 11 mm, if this has not been evaluated in the past a nonemergent thyroid ultrasound is recommended to further characterize given the patient's young age.   Report per radiology.    04/11/2021 Labs, first visit:  CBC: WBC 7.3, HGB 14.3,    CMP: Potassium 2.8 (L), Glucose 144 (H), o/w WNL (Creatinine 0.77)    D dimer: 2.3 (H)  Troponin (Collected 1050): <0.015  TSH: 1.10  HCG Qualitative Blood: Negative  Procalcitonin: <0.05  ISTAT gases lactate nora POCT (collected 1059): pH 7.52 (H), PCO2 21 (L), PO2 40, Bicarbonate 17 (L), O2 88, Lactic 4.4 (H)  ISTAT gases lactate nora POCT (collected 1307): pH 7.38, PCO2 7 (L), PO2 40, Bicarbonate 22, O2 75, Lactic 0.8    04/11/2021 Labs, second visit:  CBC: WBC 8.9, HGB 14.1,    BMP: Glucose 117(H), BUN 6(L) o/w WNL (Creatinine 0.67)   Lactic acid (Resulted: 2350): 0.8    Review of Systems   Constitutional: Positive for appetite change (decreased PO intake overall).   HENT: Negative for sore throat.         (-) Anosmia or ageusia   Respiratory: Negative for cough (not really per patient) and shortness of breath.    Cardiovascular: Positive for palpitations (tachy). Negative for chest pain.   Gastrointestinal: Positive for abdominal pain, diarrhea and nausea. Negative for blood in stool and vomiting.   Neurological: Positive for tremors and weakness. Negative for dizziness.   Psychiatric/Behavioral: The patient is nervous/anxious.    All other systems reviewed and are negative.    Allergies:  No Known Drug Allergies    Medications:  Lorazepam  Klor-con    Past Medical History:    The patient denies any past medical history.    Past Surgical History:    Nasal-sinus polypectomy  Mole excision from right thigh    Family History:    Father - prostate cancer    Social History:  The patient was not accompanied to the ED.  PCP: [facility]  Marital status:     Physical Exam     Patient Vitals for the past 24 hrs:   BP Temp Temp src Pulse Resp SpO2 Weight   04/13/21 1420 -- -- -- -- -- 98 % --   04/13/21 1230 (!) 152/89 97  F (36.1  C) Temporal 139 22 100 % 66.2 kg (146 lb)     Repeat HR: 81    Physical Exam  Physical Exam   Constitutional:  Patient is oriented to person, place, and time. They appear well-developed and well-nourished. Mild distress secondary to  anxiety.   HENT:   Mouth/Throat:   Oropharynx is clear and moist.   Eyes:    Conjunctivae normal and EOM are normal. Pupils are equal, round, and reactive to light.   Neck:    Normal range of motion.   Cardiovascular: Mildly tachycardic rate, regular rhythm and normal heart sounds.  Exam reveals no gallop and no friction rub.  No murmur heard.  Pulmonary/Chest:  Effort normal and breath sounds normal. Patient has no wheezes. Patient has no rales.   Abdominal:   Soft. Bowel sounds are normal. Patient exhibits no mass. There is no tenderness. There is no rebound and no guarding.   Musculoskeletal:  Normal range of motion. Patient exhibits no edema.   Neurological:   Patient is alert and oriented to person, place, and time. Patient has normal strength. No cranial nerve deficit or sensory deficit. GCS 15.  Skin:   Skin is warm and dry. No rash noted. No erythema.   Psychiatric:   Patient has a normal mood and affect. Patient's behavior is normal. Judgment and thought content normal.     Emergency Department Course     ECG:  Indication: Palpitations  Completed at 1233.  Read at 1256.   Sinus tachycardia  Possible Left atrial enlargement  Nonspecific ST abnormality   Rate 135 bpm. ID interval 138. QRS duration 66. QT/QTc 290/435. P-R-T axes 63 84 54.  Agree with computer interpretation.     Laboratory:  CBC: WBC 6.9, HGB 14.3,   BMP: Potassium 3.3 (L), Glucose 115 (H), Urea nitrogen 5 (L), o/w WNL (Creatinine 0.74)    Troponin (Collected 1253): <0.015    Lactic acid (result time 1311) 2.2 (H)     Emergency Department Course:    Reviewed:  I reviewed the patient's nursing notes, vitals, past medical history and care everywhere.     Assessments:  1247 I performed an exam of the patient in room 09 as documented above.  1335 Patient rechecked and updated.      Interventions:  1303 NS 1L IV Bolus  1305 Zofran 4 mg IV  1346 NS 1L IV Bolus     Disposition:  The patient was discharged to home.     Impression & Plan     CMS  Diagnoses: The Lactic acid level is elevated due to dehydration, at this time there is no sign of severe sepsis or septic shock.    Medical Decision Making:  Mary Sauceda is a 45 year old female who presents with tachycardia, anxiety, weakness from her Covid.  The symptoms that she started with which was the cough and shortness of breath have fully resolved.  She has had poor oral intake, one episode of diarrhea a day, no vomiting.  She was tachycardic on exam and I did review her chart from previous she was tachycardic on previous exams as well but quickly stabilized after receiving fluids.  EKG shows a sinus tachycardia.  Her electrolytes show that she still has some slightly low potassium but much improved.  Her lactic acid is slightly elevated however in the setting of no fever and normal white blood cell count I suspect that this is due to the poor oral intake and dehydration.  I did review her's 2 CTs from last week and do not feel that there is a need to reCT her given her respiratory symptoms have alleviated.  I do not think that this is ACS, dissection, pericarditis/myocarditis.    I suspect the patient is slowly recovering from her Covid.  I suspect anxiety has a large component to play in her symptoms.  She did state that she took an Ativan today.  I reassessed her now couple of times and her heart rate has continued to be normal 80s and sinus.  She did receive 2 L of saline here.  She has Zofran at home as well as Ativan.  She still has a potassium pills which I advised her to take until she resumes a normal diet.  She will follow-up closely with her primary care doctor.    Covid-19  Mary Sauceda was evaluated during a global COVID-19 pandemic, which necessitated consideration that the patient might be at risk for infection with the SARS-CoV-2 virus that causes COVID-19. Applicable protocols for evaluation were followed during the patient's care.   COVID-19 was considered as part of  the patient's evaluation. A test was obtained at a previous visit and reviewed & considered today.    Diagnosis:    ICD-10-CM    1. Tachycardia  R00.0     resolved   2. 2019 novel coronavirus disease (COVID-19)  U07.1    3. Anxiety  F41.9        Discharge Medications:   None.      Scribe Disclosure:  I, Cora Mcneil, am serving as a scribe at 12:47 PM on 4/13/2021 to document services personally performed by Becki Boyd MD based on my observations and the provider's statements to me.    Cora Mcneil  4/13/2021   [facility] EMERGENCY DEPARTMENT        Becki Boyd MD  04/13/21 7803

## 2021-04-13 NOTE — ED NOTES
"This nurse in chart for documentation purposes.  Pt discharged and  here to pick pt up with questions of pt care and labs.  Talked with pt and  about labs and return of pt.   then asked to talk to this nurese alone.   reassured and will help wife stay hyddrated and help her relax.   stated\" he will bring her back when she passes out\"  "

## 2021-04-14 ENCOUNTER — HOSPITAL ENCOUNTER (EMERGENCY)
Facility: CLINIC | Age: 46
Discharge: LEFT BEFORE DISCHARGE | End: 2021-04-14
Admitting: EMERGENCY MEDICINE
Payer: COMMERCIAL

## 2021-04-14 ENCOUNTER — OFFICE VISIT (OUTPATIENT)
Dept: FAMILY MEDICINE | Facility: CLINIC | Age: 46
End: 2021-04-14
Attending: EMERGENCY MEDICINE
Payer: COMMERCIAL

## 2021-04-14 ENCOUNTER — TELEPHONE (OUTPATIENT)
Dept: FAMILY MEDICINE | Facility: CLINIC | Age: 46
End: 2021-04-14

## 2021-04-14 DIAGNOSIS — E87.6 HYPOKALEMIA: ICD-10-CM

## 2021-04-14 DIAGNOSIS — F41.9 ANXIETY: Primary | ICD-10-CM

## 2021-04-14 DIAGNOSIS — U07.1 2019 NOVEL CORONAVIRUS DISEASE (COVID-19): ICD-10-CM

## 2021-04-14 PROCEDURE — 96127 BRIEF EMOTIONAL/BEHAV ASSMT: CPT | Performed by: INTERNAL MEDICINE

## 2021-04-14 PROCEDURE — 99213 OFFICE O/P EST LOW 20 MIN: CPT | Mod: 95 | Performed by: INTERNAL MEDICINE

## 2021-04-14 PROCEDURE — 999N000104 HC STATISTIC NO CHARGE

## 2021-04-14 RX ORDER — METOPROLOL SUCCINATE 25 MG/1
25 TABLET, EXTENDED RELEASE ORAL DAILY
Qty: 30 TABLET | Refills: 1 | Status: SHIPPED | OUTPATIENT
Start: 2021-04-14

## 2021-04-14 NOTE — PROGRESS NOTES
{PROVIDER CHARTING PREFERENCE:542106}    Breanna Hernandez is a 45 year old who presents for the following health issues {ACCOMPANIED BY STATEMENT (Optional):069857}    HPI     ED/UC Followup:    Facility:  [facility] ED  Date of visit: 4-  Reason for visit: Palpitations and Covid Concern  Current Status: ***       {additonal problems for provider to add (Optional):729675}    Review of Systems   {ROS COMP (Optional):091729}      Objective    LMP 03/23/2021   There is no height or weight on file to calculate BMI.  Physical Exam   {Exam List (Optional):360942}    {Diagnostic Test Results (Optional):550151}    {AMBULATORY ATTESTATION (Optional):621279}

## 2021-04-14 NOTE — PROGRESS NOTES
"Mary is a 45 year old who is being evaluated via a billable video visit.      How would you like to obtain your AVS? Mail a copy  If the video visit is dropped, the invitation should be resent by: Text to cell phone: [phone]  Will anyone else be joining your video visit? No    Video Start Time: 2:35 PM    Assessment & Plan     2019 novel coronavirus disease (COVID-19)  Doing well.  Respiration is essentially normal.  Dry cough continues but much less  - Primary Care Referral    Hypokalemia  Uncertain etiology very likely related to diarrhea in conjunction with viral onset  - Primary Care Referral    Anxiety  Anxiety in the post Covid state.  Given the fact that her airway does not appear to be reactive low-dose metoprolol will be initiated.  She should try to hold off on the utilization of lorazepam as she may develop rebound anxiety  - metoprolol succinate ER (TOPROL-XL) 25 MG 24 hr tablet; Take 1 tablet (25 mg) by mouth daily       BMI:   Estimated body mass index is 25.06 kg/m  as calculated from the following:    Height as of 4/11/21: 1.626 m (5' 4\").    Weight as of 4/13/21: 66.2 kg (146 lb).       See Patient Instructions    No follow-ups on file.    Brandyn Harvey MD  [facility]    Breanna Hernandez is a 45 year old who presents for the following health issues     HPI 45-year-old female diagnosed with COVID-19 roughly 10 days ago.  Her symptomatology started 15 days ago.  The onset was primarily fever abdominal pain and diarrhea.  5 days later she developed cough and fever.  She was assessed in [facility].  The diagnosis was made there.    She did have 2 CT scans.  No evidence of pulmonary embolic phenomena.  Evidence consistent with Covid pneumonia.  She is doing better but continues to have periods of panic.  She believes that the first may have related to issues of dehydration.  She present with a rapid heart rate.  No significant shortness of breath.    It also " should be noted that she was significantly hypokalemic.  Again this in the setting of dehydration and diarrhea.  She is eating well.  Her diet has returned to normal.      ED/UC Followup:    Facility:  [facility] Emergency Dept  Date of visit: 4/13/2021   Reason for visit: Tachycardia, 2019 novel coronavirus disease (COVID-19)   , Anxiety  Current Status:        New Patient/Transfer of Care    Review of Systems   Constitutional, HEENT, cardiovascular, pulmonary, gi and gu systems are negative, except as otherwise noted.      Objective           Vitals:  No vitals were obtained today due to virtual visit.    Physical Exam   GENERAL: Healthy, alert and no distress  EYES: Eyes grossly normal to inspection.  No discharge or erythema, or obvious scleral/conjunctival abnormalities.  RESP: No audible wheeze, cough, or visible cyanosis.  No visible retractions or increased work of breathing.    SKIN: Visible skin clear. No significant rash, abnormal pigmentation or lesions.  NEURO: Cranial nerves grossly intact.  Mentation and speech appropriate for age.  PSYCH: Mentation appears normal, affect normal/bright, judgement and insight intact, normal speech and appearance well-groomed.    Admission on 04/13/2021, Discharged on 04/13/2021   Component Date Value Ref Range Status     Interpretation ECG 04/13/2021 Click View Image link to view waveform and result   Final     WBC 04/13/2021 6.9  4.0 - 11.0 10e9/L Final     RBC Count 04/13/2021 4.80  3.8 - 5.2 10e12/L Final     Hemoglobin 04/13/2021 14.3  11.7 - 15.7 g/dL Final     Hematocrit 04/13/2021 42.1  35.0 - 47.0 % Final     MCV 04/13/2021 88  78 - 100 fl Final     MCH 04/13/2021 29.8  26.5 - 33.0 pg Final     MCHC 04/13/2021 34.0  31.5 - 36.5 g/dL Final     RDW 04/13/2021 12.1  10.0 - 15.0 % Final     Platelet Count 04/13/2021 400  150 - 450 10e9/L Final     Diff Method 04/13/2021 Automated Method   Final     % Neutrophils 04/13/2021 56.8  % Final     %  Lymphocytes 04/13/2021 34.2  % Final     % Monocytes 04/13/2021 7.8  % Final     % Eosinophils 04/13/2021 0.6  % Final     % Basophils 04/13/2021 0.3  % Final     % Immature Granulocytes 04/13/2021 0.3  % Final     Nucleated RBCs 04/13/2021 0  0 /100 Final     Absolute Neutrophil 04/13/2021 3.9  1.6 - 8.3 10e9/L Final     Absolute Lymphocytes 04/13/2021 2.4  0.8 - 5.3 10e9/L Final     Absolute Monocytes 04/13/2021 0.5  0.0 - 1.3 10e9/L Final     Absolute Eosinophils 04/13/2021 0.0  0.0 - 0.7 10e9/L Final     Absolute Basophils 04/13/2021 0.0  0.0 - 0.2 10e9/L Final     Abs Immature Granulocytes 04/13/2021 0.0  0 - 0.4 10e9/L Final     Absolute Nucleated RBC 04/13/2021 0.0   Final     Lactic Acid 04/13/2021 2.2* 0.7 - 2.0 mmol/L Final     Sodium 04/13/2021 135  133 - 144 mmol/L Final     Potassium 04/13/2021 3.3* 3.4 - 5.3 mmol/L Final     Chloride 04/13/2021 104  94 - 109 mmol/L Final     Carbon Dioxide 04/13/2021 25  20 - 32 mmol/L Final     Anion Gap 04/13/2021 6  3 - 14 mmol/L Final     Glucose 04/13/2021 115* 70 - 99 mg/dL Final     Urea Nitrogen 04/13/2021 5* 7 - 30 mg/dL Final     Creatinine 04/13/2021 0.74  0.52 - 1.04 mg/dL Final     GFR Estimate 04/13/2021 >90  >60 mL/min/[1.73_m2] Final    Comment: Non  GFR Calc  Starting 12/18/2018, serum creatinine based estimated GFR (eGFR) will be   calculated using the Chronic Kidney Disease Epidemiology Collaboration   (CKD-EPI) equation.       GFR Estimate If Black 04/13/2021 >90  >60 mL/min/[1.73_m2] Final    Comment:  GFR Calc  Starting 12/18/2018, serum creatinine based estimated GFR (eGFR) will be   calculated using the Chronic Kidney Disease Epidemiology Collaboration   (CKD-EPI) equation.       Calcium 04/13/2021 9.2  8.5 - 10.1 mg/dL Final     Troponin I ES 04/13/2021 <0.015  0.000 - 0.045 ug/L Final    Comment: The 99th percentile for upper reference range is 0.045 ug/L.  Troponin values   in the range of 0.045 - 0.120 ug/L  may be associated with risks of adverse   clinical events.                   Video-Visit Details    Type of service:  Video Visit    Video End Time: 2:45 PM    Originating Location (pt. Location): Home    Distant Location (provider location):  [facility]     Platform used for Video Visit: Unable to complete video visit

## 2021-04-14 NOTE — TELEPHONE ENCOUNTER
"Dr Harvey,     Pt called following today's visit     Asking about the Metoprolol script sent today     1) is this just a \"temporary thing\"   2) does she need to take it every day - or can it be PRN?   3) how long is she going to be on it/can she just stop when she no longer needs/or what is the plan for anxiety in the future     Please advise    Can we leave a detailed message on this number? YES  Phone number patient can be reached at: Home number on file [phone] (home)    Do Oconnor RN  [facility] Triage      "

## 2021-04-15 ENCOUNTER — HOSPITAL ENCOUNTER (EMERGENCY)
Facility: CLINIC | Age: 46
Discharge: HOME | End: 2021-04-15
Attending: EMERGENCY MEDICINE | Admitting: EMERGENCY MEDICINE
Payer: COMMERCIAL

## 2021-04-15 ENCOUNTER — APPOINTMENT (OUTPATIENT)
Dept: CARDIOLOGY | Facility: CLINIC | Age: 46
End: 2021-04-15
Attending: EMERGENCY MEDICINE
Payer: COMMERCIAL

## 2021-04-15 VITALS
HEART RATE: 77 BPM | DIASTOLIC BLOOD PRESSURE: 85 MMHG | OXYGEN SATURATION: 97 % | SYSTOLIC BLOOD PRESSURE: 131 MMHG | TEMPERATURE: 98.2 F | RESPIRATION RATE: 18 BRPM

## 2021-04-15 DIAGNOSIS — F43.22 ADJUSTMENT DISORDER WITH ANXIOUS MOOD: ICD-10-CM

## 2021-04-15 DIAGNOSIS — N39.0 URINARY TRACT INFECTION WITHOUT HEMATURIA, SITE UNSPECIFIED: ICD-10-CM

## 2021-04-15 DIAGNOSIS — R00.2 PALPITATIONS: ICD-10-CM

## 2021-04-15 LAB
ALBUMIN UR-MCNC: 10 MG/DL
AMPHETAMINES UR QL SCN: NEGATIVE
APPEARANCE UR: ABNORMAL
BARBITURATES UR QL: NEGATIVE
BENZODIAZ UR QL: NEGATIVE
BILIRUB UR QL STRIP: NEGATIVE
CANNABINOIDS UR QL SCN: NEGATIVE
COCAINE UR QL: NEGATIVE
COLOR UR AUTO: ABNORMAL
GLUCOSE UR STRIP-MCNC: NEGATIVE MG/DL
HGB UR QL STRIP: ABNORMAL
INTERPRETATION ECG - MUSE: NORMAL
KETONES UR STRIP-MCNC: 20 MG/DL
LEUKOCYTE ESTERASE UR QL STRIP: ABNORMAL
MUCOUS THREADS #/AREA URNS LPF: PRESENT /LPF
NITRATE UR QL: NEGATIVE
OPIATES UR QL SCN: NEGATIVE
PCP UR QL SCN: NEGATIVE
PH UR STRIP: 6.5 PH (ref 5–7)
RBC #/AREA URNS AUTO: >182 /HPF (ref 0–2)
SOURCE: ABNORMAL
SP GR UR STRIP: 1.01 (ref 1–1.03)
SQUAMOUS #/AREA URNS AUTO: 4 /HPF (ref 0–1)
UROBILINOGEN UR STRIP-MCNC: 0 MG/DL (ref 0–2)
WBC #/AREA URNS AUTO: 136 /HPF (ref 0–5)

## 2021-04-15 PROCEDURE — 93005 ELECTROCARDIOGRAM TRACING: CPT

## 2021-04-15 PROCEDURE — 80307 DRUG TEST PRSMV CHEM ANLYZR: CPT | Performed by: EMERGENCY MEDICINE

## 2021-04-15 PROCEDURE — 250N000013 HC RX MED GY IP 250 OP 250 PS 637: Performed by: EMERGENCY MEDICINE

## 2021-04-15 PROCEDURE — 93227 XTRNL ECG REC<48 HR R&I: CPT | Performed by: INTERNAL MEDICINE

## 2021-04-15 PROCEDURE — 87086 URINE CULTURE/COLONY COUNT: CPT | Performed by: EMERGENCY MEDICINE

## 2021-04-15 PROCEDURE — 96361 HYDRATE IV INFUSION ADD-ON: CPT

## 2021-04-15 PROCEDURE — 96365 THER/PROPH/DIAG IV INF INIT: CPT

## 2021-04-15 PROCEDURE — 99285 EMERGENCY DEPT VISIT HI MDM: CPT | Mod: 25

## 2021-04-15 PROCEDURE — 250N000011 HC RX IP 250 OP 636: Performed by: EMERGENCY MEDICINE

## 2021-04-15 PROCEDURE — 258N000003 HC RX IP 258 OP 636: Performed by: EMERGENCY MEDICINE

## 2021-04-15 PROCEDURE — 90791 PSYCH DIAGNOSTIC EVALUATION: CPT

## 2021-04-15 PROCEDURE — 81001 URINALYSIS AUTO W/SCOPE: CPT | Performed by: EMERGENCY MEDICINE

## 2021-04-15 PROCEDURE — 93225 XTRNL ECG REC<48 HRS REC: CPT

## 2021-04-15 RX ORDER — CEFTRIAXONE 1 G/1
1 INJECTION, POWDER, FOR SOLUTION INTRAMUSCULAR; INTRAVENOUS ONCE
Status: COMPLETED | OUTPATIENT
Start: 2021-04-15 | End: 2021-04-15

## 2021-04-15 RX ORDER — ONDANSETRON 4 MG/1
4 TABLET, ORALLY DISINTEGRATING ORAL ONCE
Status: COMPLETED | OUTPATIENT
Start: 2021-04-15 | End: 2021-04-15

## 2021-04-15 RX ORDER — CEPHALEXIN 500 MG/1
500 CAPSULE ORAL 3 TIMES DAILY
Qty: 15 CAPSULE | Refills: 0 | Status: SHIPPED | OUTPATIENT
Start: 2021-04-15 | End: 2021-04-21

## 2021-04-15 RX ORDER — LORAZEPAM 0.5 MG/1
0.5 TABLET ORAL ONCE
Status: COMPLETED | OUTPATIENT
Start: 2021-04-15 | End: 2021-04-15

## 2021-04-15 RX ORDER — SODIUM CHLORIDE 9 MG/ML
INJECTION, SOLUTION INTRAVENOUS CONTINUOUS
Status: DISCONTINUED | OUTPATIENT
Start: 2021-04-15 | End: 2021-04-15 | Stop reason: HOSPADM

## 2021-04-15 RX ADMIN — CEFTRIAXONE SODIUM 1 G: 1 INJECTION, POWDER, FOR SOLUTION INTRAMUSCULAR; INTRAVENOUS at 07:34

## 2021-04-15 RX ADMIN — SODIUM CHLORIDE 1000 ML: 9 INJECTION, SOLUTION INTRAVENOUS at 07:34

## 2021-04-15 RX ADMIN — LORAZEPAM 0.5 MG: 0.5 TABLET ORAL at 06:23

## 2021-04-15 RX ADMIN — ONDANSETRON 4 MG: 4 TABLET, ORALLY DISINTEGRATING ORAL at 06:23

## 2021-04-15 ASSESSMENT — ENCOUNTER SYMPTOMS
NAUSEA: 1
DIARRHEA: 0
ABDOMINAL PAIN: 0
FEVER: 0
PALPITATIONS: 1
CHILLS: 0
COUGH: 0

## 2021-04-15 NOTE — ED TRIAGE NOTES
Pt c/o anxious feeling, panic feeling since 0030. Has been seen here for similar issues several times.    Pt A&O x 3, CMS x 3, ABCD's adequate in triage    Is covid positive.

## 2021-04-15 NOTE — ED PROVIDER NOTES
History   Chief Complaint:  Palpitations    HPI   Mary Sauceda is a 45 year old female, who has been seen several times over the past week or so for tachycardia type symptoms and discharged after an unremarkable evaluation and presents to the ED for evaluation of palpitations this morning. The patient reports she took a 25 mg metoprolol dose at 1800 last night, as she was prescribed it yesterday for her palpitations, however, at 0030, she began to feel palpitations and a heart racing type feeling. She tried to calm herself down using her own methods, not trying Ativan yet, but was unable to so she decided to come in for evaluation. The patient has no chest pain or pressure. She has no fever, chills, or cough. She denies any abdominal pain, diarrhea, or any other acute symptoms. She has been eating and drinking relatively at baseline. She has been slightly nauseous and has a dry throat.     Review of Systems   Constitutional: Negative for chills and fever.   Respiratory: Negative for cough.    Cardiovascular: Positive for palpitations. Negative for chest pain.   Gastrointestinal: Positive for nausea. Negative for abdominal pain and diarrhea.   All other systems reviewed and are negative.    Allergies:  No known drug allergies    Medications:    Metoprolol  Ativan    Past Medical History:    The patient denies past medical history.     Past Surgical History:    Nasal polypectomy    Family History:    The patient denies past family history.     Social History:  PCP: [facility]  Presents to the ED alone    Physical Exam     Patient Vitals for the past 24 hrs:   BP Temp Temp src Pulse Resp SpO2   04/15/21 0922 -- -- -- -- -- 97 %   04/15/21 0816 -- -- -- -- -- 98 %   04/15/21 0700 131/85 -- -- 77 18 98 %   04/15/21 0630 (!) 144/96 -- -- 86 18 98 %   04/15/21 0600 (!) 141/98 98.2  F (36.8  C) Oral 120 22 100 %       Physical Exam  Vitals signs reviewed.   HENT:      Head: Normocephalic.      Right  Ear: Tympanic membrane normal.      Left Ear: Tympanic membrane normal.   Neck:      Musculoskeletal: Normal range of motion.   Cardiovascular:      Rate and Rhythm: Regular rhythm. Tachycardia present.   Pulmonary:      Effort: Pulmonary effort is normal.      Breath sounds: Normal breath sounds.   Skin:     Capillary Refill: Capillary refill takes less than 2 seconds.   Neurological:      General: No focal deficit present.      Mental Status: She is alert.   Psychiatric:      Comments: Anxious worried she is going to die from Covid.           Emergency Department Course   ECG (06:15:46):  Rate 103 bpm. AR interval 144. QRS duration 76. QT/QTc 344/450. P-R-T axes 57 62 32. Sinus tachycardia. Possible left atrial enlargement. Nonspecific ST abnormality. Abnormal ECG. Interpreted by Dudley Boykin MD.    Laboratory:  UA: Ketone, Blood Large, Protein Albumin, Leukocyte Esterase Small, WBC/ (H), RBC/HPF >182 (H), Squamous Epithelial/HPF 4 (H), Mucous Present, o/w Negative    Drug abuse screen 77 urine: Negative    Urine Culture Aerobic Bacterial: Pending    Emergency Department Course:    Reviewed:  I reviewed the patient's nursing notes, vitals, past available medical records.     Assessments:  1800: I obtained history and examined the patient as noted above.     0645: I rechecked the patient and explained findings.     0703: I updated the patient on her urine test findings. She denies urinary symptoms and is currently having her menstrual period.    0755: I rechecked the patient. Explained findings to patient. The patient agrees to DEC consultation.     Interventions:  0623: Zofran 4 mg PO  0623: Ativan 0.5 mg PO  0734: NS 1L IV Bolus   0737: Rocephin 1 g in 100 IV infusion    Disposition:  The patient was discharged to home.    Impression & Plan    Medical Decision Making:  Patient presents the emergency room yet again for ongoing concerns for anxiety and palpitations.  Patient is well-appearing on  arrival heart rate is elevated but after anxiety medications patient seemed to improve.  Patient was seen on multiple occasions in the last 2 days has had CT scans of the chest multiple lab work these are all normal.  Patient is past 2 weeks after Covid positive test.  There is no concerns for hypoxia.  Patient is continually focused on dehydration initially did not recommend any for the lab test but UA is the only test that was not sent and was positive for infection.  1 g of ceftriaxone was given I also had the clinical psychologist, discussed with the patient anxiety triggered by Covid positive testing.  Patient was offered reassurance and discharged home patient is encouraged to take continue to take metoprolol we will add in Keflex watch urine culture and to follow-up with primary care.    Diagnosis:    ICD-10-CM    1. Palpitations  R00.2    2. Urinary tract infection without hematuria, site unspecified  N39.0    3. Adjustment disorder with anxious mood  F43.22        Discharge Medications:  New Prescriptions    CEPHALEXIN (KEFLEX) 500 MG CAPSULE    Take 1 capsule (500 mg) by mouth 3 times daily for 5 days     Scribe Disclosure:  Thanh CRUZ, am serving as a scribe at 6:00 AM on 4/15/2021 to document services personally performed by Dudley Boykin MD based on my observations and the provider's statements to me.      Dudley Boykin MD  04/17/21 0020

## 2021-04-15 NOTE — TELEPHONE ENCOUNTER
Every day.  Temporary, can stop when anxiety is under control.  Brandyn Harvey MD on 4/14/2021 at 7:31 PM

## 2021-04-15 NOTE — ED NOTES
Pt came in for HR in the 160's at home. Pt given a beta blocker today by PCP and took PTA. Pt HR now 105 and doesn't want to be checked in at this time

## 2021-04-15 NOTE — ED NOTES
Pt came up to the desk reports her HR is 80. Pt feeling better and doesn't want to be checked in.

## 2021-04-15 NOTE — DISCHARGE INSTRUCTIONS
We have rechecked her heart rate and your heart rate is improved to 77 without medical intervention other than Ativan and nausea medication.  They we have checked a urine test and it is positive for urine infection.  Please complete course of antibiotics we are doing a urine culture if you need a change in antibiotic we will call you.  We are placing a Holter monitor due to concerns of heart rates in the 150s.  The emergency room is always in sinus rhythm.  Please follow-up with your regular doctor to discuss with the results of this monitor.  The emergency room is here to support you and if you have ongoing rapid pulse that does not resolve we are here to recheck you at any time.  We are suspicious that part of your tachycardia and rapid pulse is related to anxiety please follow-up with the mental health recommendations as per provided by our clinical psychologist.  Continue metoprolol and okay to use Ativan when needed.

## 2021-04-15 NOTE — TELEPHONE ENCOUNTER
Left detailed message on VM as instructed below. Patient is currently in ED for eval, so may be having med.changes in addition to this.  Sarah Doherty RN on 4/15/2021 at 8:51 AM

## 2021-04-15 NOTE — ED NOTES
Pt resting in the waiting room. Pt has pulse ox on monitoring her HR. Pt . Pt asking not to be checked in at this time.

## 2021-04-16 LAB
BACTERIA SPEC CULT: NORMAL
SPECIMEN SOURCE: NORMAL

## 2021-04-16 NOTE — RESULT ENCOUNTER NOTE
Final urine culture report is negative.  Adult Negative Urine culture parameters: Any # Urogenital single or mixed organism, <10,000 col/ml single organism (cath specimen), and <50,000 col/ml single organism (midstream or cath specimen).  Treatment recommendations per [facility] ED Lab Result Urine Culture protocol.

## 2021-04-17 LAB
BACTERIA SPEC CULT: NO GROWTH
BACTERIA SPEC CULT: NO GROWTH
SPECIMEN SOURCE: NORMAL
SPECIMEN SOURCE: NORMAL

## 2021-04-21 ENCOUNTER — VIRTUAL VISIT (OUTPATIENT)
Dept: FAMILY MEDICINE | Facility: CLINIC | Age: 46
End: 2021-04-21
Payer: COMMERCIAL

## 2021-04-21 ENCOUNTER — TELEPHONE (OUTPATIENT)
Dept: FAMILY MEDICINE | Facility: CLINIC | Age: 46
End: 2021-04-21

## 2021-04-21 DIAGNOSIS — N39.0 URINARY TRACT INFECTION WITHOUT HEMATURIA, SITE UNSPECIFIED: Primary | ICD-10-CM

## 2021-04-21 DIAGNOSIS — F41.9 ANXIETY: ICD-10-CM

## 2021-04-21 PROCEDURE — 99213 OFFICE O/P EST LOW 20 MIN: CPT | Mod: 95 | Performed by: INTERNAL MEDICINE

## 2021-04-21 RX ORDER — MIRTAZAPINE 7.5 MG/1
7.5 TABLET, FILM COATED ORAL AT BEDTIME
Qty: 30 TABLET | Refills: 1 | Status: SHIPPED | OUTPATIENT
Start: 2021-04-21

## 2021-04-21 ASSESSMENT — PATIENT HEALTH QUESTIONNAIRE - PHQ9: 5. POOR APPETITE OR OVEREATING: NEARLY EVERY DAY

## 2021-04-21 ASSESSMENT — ANXIETY QUESTIONNAIRES
5. BEING SO RESTLESS THAT IT IS HARD TO SIT STILL: NEARLY EVERY DAY
3. WORRYING TOO MUCH ABOUT DIFFERENT THINGS: NEARLY EVERY DAY
1. FEELING NERVOUS, ANXIOUS, OR ON EDGE: NEARLY EVERY DAY
GAD7 TOTAL SCORE: 21
7. FEELING AFRAID AS IF SOMETHING AWFUL MIGHT HAPPEN: NEARLY EVERY DAY
IF YOU CHECKED OFF ANY PROBLEMS ON THIS QUESTIONNAIRE, HOW DIFFICULT HAVE THESE PROBLEMS MADE IT FOR YOU TO DO YOUR WORK, TAKE CARE OF THINGS AT HOME, OR GET ALONG WITH OTHER PEOPLE: NOT DIFFICULT AT ALL
6. BECOMING EASILY ANNOYED OR IRRITABLE: NEARLY EVERY DAY
2. NOT BEING ABLE TO STOP OR CONTROL WORRYING: NEARLY EVERY DAY

## 2021-04-21 NOTE — TELEPHONE ENCOUNTER
Reason for Call:  Medication or medication refill:    Do you use a [facility]?  Name of the pharmacy and phone number for the current request:  [id number] - [place], [place] - [address] AT [facility] [address]      Name of the medication requested: metoprolol succinate ER (TOPROL-XL) 25 MG 24 hr tablet [39189] (Order [id number])    Other request: pt lost her pill bottle and would like us to send authorization to fill refill early. She is really anxious not having it    Can we leave a detailed message on this number? YES    Phone number patient can be reached at: Home number on file [phone] (home)    Best Time: any    Call taken on 4/21/2021 at 1:51 PM by Adi Jefferson

## 2021-04-21 NOTE — PROGRESS NOTES
"Mary is a 46 year old who is being evaluated via a billable telephone visit.      What phone number would you like to be contacted at? [phone]  How would you like to obtain your AVS? Mail a copy    Assessment & Plan     Urinary tract infection without hematuria, site unspecified  Would like to recheck urinalysis  - UA with Microscopic reflex to Culture; Future    Anxiety  Related to recent Covid infection.  Her Holter monitor only revealed sinus tachycardia.  She states that the metoprolol has reduced this to some extent.  Continues to have episodes of panic.  Sleep is problematic.  Her appetite is poor.  I discussed with her the importance of her discontinuing her lorazepam.  - mirtazapine (REMERON) 7.5 MG tablet; Take 1 tablet (7.5 mg) by mouth At Bedtime       BMI:   Estimated body mass index is 25.06 kg/m  as calculated from the following:    Height as of 4/11/21: 1.626 m (5' 4\").    Weight as of 4/13/21: 66.2 kg (146 lb).       See Patient Instructions    No follow-ups on file.    Brandyn Harvey MD  [facility]    Breanna Hernandez is a 46 year old who presents for the following health issues     HPI 46-year-old presenting to clinic today for physical examination.  For the most part she is doing about the same as she was prior.  Continues to have issues of anxiety and panic.  Continues to have episodes of tachycardia.  She states that the beta-blocker has helped reduce her heart rate.    Continues to have sleep difficulties.  Her appetite is poor.  She needs to force herself to eat.  She feels fatigued throughout the course of the day which she relates to poor sleep.      ED/UC Followup:    Facility:  [facility]  Date of visit: 04/15/2021   Reason for visit: Palpitations, Urinary tract infection without hematuria, Adjustment disorder with anxious mood  Current Status: Slightly improved       New Patient/Transfer of Care    Review of Systems "   Constitutional, HEENT, cardiovascular, pulmonary, gi and gu systems are negative, except as otherwise noted.      Objective           Vitals:  No vitals were obtained today due to virtual visit.    Physical Exam   healthy, alert and no distress  PSYCH: Alert and oriented times 3; coherent speech, normal   rate and volume, able to articulate logical thoughts, able   to abstract reason, no tangential thoughts, no hallucinations   or delusions  Her affect is normal  RESP: No cough, no audible wheezing, able to talk in full sentences  Remainder of exam unable to be completed due to telephone visits    Admission on 04/15/2021, Discharged on 04/15/2021   Component Date Value Ref Range Status     Interpretation ECG 04/15/2021 Click View Image link to view waveform and result   Final     Color Urine 04/15/2021 Light Brown   Final     Appearance Urine 04/15/2021 Slightly Cloudy   Final     Glucose Urine 04/15/2021 Negative  NEG^Negative mg/dL Final     Bilirubin Urine 04/15/2021 Negative  NEG^Negative Final     Ketones Urine 04/15/2021 20* NEG^Negative mg/dL Final     Specific Gravity Urine 04/15/2021 1.008  1.003 - 1.035 Final     Blood Urine 04/15/2021 Large* NEG^Negative Final     pH Urine 04/15/2021 6.5  5.0 - 7.0 pH Final     Protein Albumin Urine 04/15/2021 10* NEG^Negative mg/dL Final     Urobilinogen mg/dL 04/15/2021 0.0  0.0 - 2.0 mg/dL Final     Nitrite Urine 04/15/2021 Negative  NEG^Negative Final     Leukocyte Esterase Urine 04/15/2021 Small* NEG^Negative Final     Source 04/15/2021 Midstream Urine   Final     WBC Urine 04/15/2021 136* 0 - 5 /HPF Final     RBC Urine 04/15/2021 >182* 0 - 2 /HPF Final     Squamous Epithelial /HPF Urine 04/15/2021 4* 0 - 1 /HPF Final     Mucous Urine 04/15/2021 Present* NEG^Negative /LPF Final     Amphetamine Qual Urine 04/15/2021 Negative  NEG^Negative Final    Cutoff for a negative amphetamine is 500 ng/mL or less.     Barbiturates Qual Urine 04/15/2021 Negative  NEG^Negative  Final    Cutoff for a negative barbiturate is 200 ng/mL or less.     Benzodiazepine Qual Urine 04/15/2021 Negative  NEG^Negative Final    Cutoff for a negative benzodiazepine is 200 ng/mL or less.     Cannabinoids Qual Urine 04/15/2021 Negative  NEG^Negative Final    Cutoff for a negative cannabinoid is 50 ng/mL or less.     Cocaine Qual Urine 04/15/2021 Negative  NEG^Negative Final    Cutoff for a negative cocaine is 300 ng/mL or less.     Opiates Qualitative Urine 04/15/2021 Negative  NEG^Negative Final    Cutoff for a negative opiate is 300 ng/mL or less.     PCP Qual Urine 04/15/2021 Negative  NEG^Negative Final    Cutoff for a negative PCP is 25 ng/mL or less.     Specimen Description 04/15/2021 Midstream Urine   Final     Culture Micro 04/15/2021    Final                    Value:>100,000 colonies/mL  mixed urogenital mandi  Susceptibility testing not routinely done                   Phone call duration: 10 minutes

## 2021-04-22 ASSESSMENT — ANXIETY QUESTIONNAIRES: GAD7 TOTAL SCORE: 21
